# Patient Record
Sex: FEMALE | Race: WHITE | NOT HISPANIC OR LATINO
[De-identification: names, ages, dates, MRNs, and addresses within clinical notes are randomized per-mention and may not be internally consistent; named-entity substitution may affect disease eponyms.]

---

## 2023-03-21 ENCOUNTER — NON-APPOINTMENT (OUTPATIENT)
Age: 53
End: 2023-03-21

## 2023-03-31 PROBLEM — Z00.00 ENCOUNTER FOR PREVENTIVE HEALTH EXAMINATION: Status: ACTIVE | Noted: 2023-03-31

## 2023-04-03 ENCOUNTER — FORM ENCOUNTER (OUTPATIENT)
Age: 53
End: 2023-04-03

## 2023-04-03 ENCOUNTER — APPOINTMENT (OUTPATIENT)
Dept: HEMATOLOGY ONCOLOGY | Facility: CLINIC | Age: 53
End: 2023-04-03

## 2023-04-06 NOTE — DISCUSSION/SUMMARY
[FreeTextEntry1] : REASON FOR CONSULT\par Suzanna Cooper is a 52-year-old female referred by Dr. Gayle French for cancer genetic counseling and risk assessment due to a family history of cancer. Ms. Cooper was seen on 4/3/2023 at which time medical and family history was ascertained and a pedigree constructed. Genetic counselor Loreta Horn, RGC was also present for this session.\par \par RELEVANT MEDICAL HISTORY\par Ms. Cooper is a healthy individual with no reported history of cancer. She has a family history of pancreatic, brain, and leukemia cancers, see below.\par \par Of note, Ms. Cooper reported she had negative BRCA1 and BRCA2 genetic testing about 7 years ago ordered by her gynecologist. A copy of this report was not available for review at the time of the session. \par \par OTHER MEDICAL AND SURGICAL HISTORY:\par •	Medical History: None\par •	Surgical History:  2x, microdiscectomy \par \par OB/GYN HISTORY:\par Obstetrical History: \par Age at Menarche: 13\par Menopausal Status: Perimenopausal\par Age at First Live Birth: 28\par Oral Contraceptive Use: pill use reported prior to first pregnancy, 6 years total\par Hormone Replacement Therapy: None\par \par CANCER SCREENING HISTORY:  \par Breast: \par •	Mammography and sonography: annual, most recent imaging reported in 2022, reported negative\par •	MRI: None\par •	Biopsies: None\par GYN:\par •	Pelvic Examination: annual, most recent exam reported in 2022\par o	Patient reported no h/o gynecologic concerns\par Colon:\par •	Colonoscopy: follow up in 3 years, baseline reported in 2022\par o	Patient reported no h/o polyps, reported some signs of diverticulosis were found\par •	Upper Endoscopy: None\par Skin:  \par •	FBSE: 1–2-year frequency, most recent exam reported 2 years ago\par •	Lesions biopsied/removed: h/o benign removals reported – most recent 10 years ago\par \par SOCIAL HISTORY:\par •	Tobacco-product use: None\par •	Environmental exposures: None\par \par FAMILY HISTORY:\par Maternal ancestry was reported as [place] and paternal ancestry was reported as Polish, American. A detailed family history of cancer was ascertained, see below and scanned chart for pedigree. \par \par To Ms. Cooper’s knowledge no one in the family has had germline testing for cancer susceptibility. Ashkenazi Orthodoxy ancestry was confirmed in her paternal family history. Consanguinity was denied. \par 	\par RISK ASSESSMENT:\par Ms. Cooper’s family history is suggestive of a hereditary cancer syndrome given her father’s pancreatic cancer diagnosis at age 79, her paternal first cousin’s possible pancreatic cancer diagnosis in his early 60s, her four paternal first cousins once removed with pancreatic cancer (two were diagnosed in their late 50s, one in the early 70s, and one at an unknown age), and a paternal second cousin with pancreatic cancer in his mid-60s. Additionally, she has a maternal aunt with brain cancer in her early 60s and her maternal grandfather had leukemia in his mid-40s. The patient meets National Comprehensive Cancer Network (NCCN) criteria for genetic testing. We recommended genetic testing for genes associated with pancreatic cancers. This test analyzes 20 genes: APC, NOLAN, BMPR1A, BRCA1, BRCA2, CDKN2A, EPCAM, MEN1, MLH1, MSH2, MSH6, NF1, PALB2, PMS2, SMAD4, STK11, TP53, TSC1, TSC2, VHL.\par \par The risks, benefits and limitations of genetic testing were discussed with Ms. Cooper. In addition, we discussed the purpose of genetic testing and possible test results (positive, negative, inconclusive) along with associated medical management options and psychosocial implications. Insurance coverage and potential out of pocket costs were also discussed. The Genetic Information Non-discrimination Act (ED) was reviewed.\par \par It was explained that risk assessment is based upon medical and family history as provided and may change in the future should new information be obtained. \par \par Following our discussion, Ms. Cooper consented to the above-mentioned genetic testing panel. Blood was drawn in our laboratory and sent to [facility] today.\par \par PLAN:\par \par 1.	Blood drawn today will be sent to [facility] for analysis. \par 2.	We will contact Ms. Cooper to schedule a follow-up appointment once the results are available. Results generally return in 2-3 weeks. \par \par For any additional questions please call Cancer Genetics at [phone]. \par \par \par Melody Mike MS, [facility]\par Genetic Counselor, Cancer Genetics

## 2023-04-10 ENCOUNTER — FORM ENCOUNTER (OUTPATIENT)
Age: 53
End: 2023-04-10

## 2023-04-18 ENCOUNTER — NON-APPOINTMENT (OUTPATIENT)
Age: 53
End: 2023-04-18

## 2023-04-18 NOTE — DISCUSSION/SUMMARY
[FreeTextEntry1] : REASON FOR CONSULT\par Suzanna Cooper is a 52-year-old female who was contacted on 4/18/2023 for a discussion regarding her negative genetic testing results related to hereditary cancer predisposition. This session was conducted via telephone. \par \par Ms. Cooper was originally seen by the Cancer Genetics Service on 4/3/2023 for hereditary cancer predisposition risk assessment due to a family history of cancer. At that time, Ms. Cooper decided to pursue genetic testing for genes associated with pancreatic cancer offered by [facility].\par \par TEST RESULTS: NEGATIVE\par NO pathogenic (disease-causing) variants or variants of uncertain significance were detected in any of the following genes (20): APC, NOLAN, BMPR1A, BRCA1, BRCA2, CDKN2A, EPCAM, MEN1, MLH1, MSH2, MSH6, NF1, PALB2, PMS2, SMAD4, STK11, TP53, TSC1, TSC2, VHL.\par \par RESULTS INTERPRETATION AND ASSESSMENT:\par Given Ms. Cooper’s current reported family history of cancer, and her negative genetic test results, the following screening guidelines and risk-reducing recommendations were discussed:\par \par PANCREATIC: \par •	At this time, there is no proven effective screening for pancreatic cancer. Furthermore, in the absence of a known inherited predisposition to pancreatic cancer or a strong family history of pancreatic cancer, there are no recommended guidelines to screen for pancreatic cancer in relatives of an affected individual.\par •	No consensus exists for pancreatic cancer screening in individuals with a family history of pancreatic cancer. Per National Comprehensive Cancer Network (NCCN) and CAPS Consortium, screening may be considered for individuals with a family history of exocrine pancreatic cancer in =2 first-degree relatives even in the absence of a known pathogenic/likely pathogenic germline variant. Given Ms. Cooper’s family history of pancreatic cancer in her father and other paternal relatives, we discussed the option of meeting with the [facility] to discuss pancreatic cancer screening. We briefly reviewed the options for screening modality including contrast-enhanced MRI/magnetic resonance cholangiopancreatography (MRCP) and/or endoscopic ultrasound (EUS). We also discussed the pros, cons, and limitations of pancreatic cancer screening at this time. Ms. Cooper expressed interest in meeting with the [facility] for a discussion. We will refer Ms. Cooper to the high-risk clinic at the [facility] to discuss the option of screening. \par \par OTHER:\par •	In the absence of other indications, Ms. Cooper should practice age-appropriate cancer screening of other organ systems as recommended for the general population.\par \par We also discussed the limitations of negative results:\par 1.	The cause of Ms. Cooper’s family history of cancer remains unknown. The cancer(s) may have developed randomly, or due to environmental factors.  \par 2.	This negative result does not completely rule out a hereditary basis for the reported personal and/or family history due to limitations in technology or a variant being present in an unidentified gene. \par 3.	Variants in other genes would not be identified by this analysis, so this negative result does not rule out the likelihood of having a mutation in a different hereditary cancer gene or the possibility of ever developing cancer.\par 4.	It is possible there is a hereditary cancer predisposition gene mutation in the family, but the patient did not inherit it. \par \par We informed Ms. Cooper that our knowledge of genetics and inherited cancer conditions is changing rapidly. Therefore, we recommended that Ms. Cooper contact our office, every 2 to 3 years, to discuss relevant advances in cancer genetics.  We emphasized the importance of re-contacting us with updates regarding her personal and family history of cancer as well as any updates regarding additional cancer genetic test results performed for the patient and/or family members.  Such updates could possibly change our risk assessment and recommendations. \par \par In addition, we discussed Ms. Cooper’s sister and paternal first cousins can all consider pursuing cancer risk assessment genetic counseling with the option of genetic testing given the family history.\par \par PLAN:\par 1.	These results do not change Ms. Cooper’s medical management. Long-term management and surveillance should be based on general population guidelines. \par 2.	Based on her family history of pancreatic cancer, the patient may consider increased screening via [facility] (see discussion above) at the discretion of that center’s providers.\par 3.	Patient informed consult note(s) will be available through their [facility] patient portal and genetic test results will be released via [facility]’s portal.\par 4.	Ms. Cooper was encouraged to contact us every 2-3 years to discuss relevant advances in cancer genetics, or sooner if there are any changes in her personal or family history of cancer.\par \par \par For any additional questions please call Cancer Genetics at [phone]. \par \par \par Melody Mike MS, [facility]\par Genetic Counselor, Cancer Genetics

## 2023-04-25 DIAGNOSIS — Z91.89 OTHER SPECIFIED PERSONAL RISK FACTORS, NOT ELSEWHERE CLASSIFIED: ICD-10-CM

## 2023-04-25 DIAGNOSIS — C25.9 MALIGNANT NEOPLASM OF PANCREAS, UNSPECIFIED: ICD-10-CM
